# Patient Record
Sex: FEMALE | Race: OTHER | Employment: OTHER | URBAN - METROPOLITAN AREA
[De-identification: names, ages, dates, MRNs, and addresses within clinical notes are randomized per-mention and may not be internally consistent; named-entity substitution may affect disease eponyms.]

---

## 2022-01-05 ENCOUNTER — EMERGENCY VISIT (OUTPATIENT)
Dept: URBAN - METROPOLITAN AREA CLINIC 35 | Facility: CLINIC | Age: 62
End: 2022-01-05

## 2022-01-05 DIAGNOSIS — H16.223: ICD-10-CM

## 2022-01-05 DIAGNOSIS — T15.12XA: ICD-10-CM

## 2022-01-05 PROCEDURE — 92002 INTRM OPH EXAM NEW PATIENT: CPT

## 2022-01-05 PROCEDURE — 65205 REMOVE FOREIGN BODY FROM EYE: CPT

## 2022-01-05 ASSESSMENT — VISUAL ACUITY
OS_PH: 20/30
OS_SC: 20/40 -
OD_PH: 20/30
OD_SC: 20/50+2

## 2022-01-05 NOTE — PROCEDURE NOTE: CLINICAL
PROCEDURE NOTE: Removal of Conjunctival FB, Superficial #1 Left Upper Lid. Anesthesia: Topical. Prep: Antibiotic Drops q 5min x 3. Prior to treatment, the risks/benefits/alternatives were discussed. The patient wished to proceed with procedure. Superficial conjunctival FB removed with forcep/needle. Globe and conjunctiva intact. Patient tolerated procedure well. There were no complications. Post procedure instructions given. Akanksha Cortes